# Patient Record
Sex: MALE | Race: WHITE | NOT HISPANIC OR LATINO | Employment: OTHER | URBAN - METROPOLITAN AREA
[De-identification: names, ages, dates, MRNs, and addresses within clinical notes are randomized per-mention and may not be internally consistent; named-entity substitution may affect disease eponyms.]

---

## 2025-04-23 ENCOUNTER — HOSPITAL ENCOUNTER (EMERGENCY)
Facility: HOSPITAL | Age: 50
Discharge: HOME | End: 2025-04-23
Payer: COMMERCIAL

## 2025-04-23 VITALS
OXYGEN SATURATION: 100 % | HEART RATE: 98 BPM | TEMPERATURE: 98.2 F | BODY MASS INDEX: 33.56 KG/M2 | RESPIRATION RATE: 17 BRPM | DIASTOLIC BLOOD PRESSURE: 88 MMHG | WEIGHT: 247.8 LBS | HEIGHT: 72 IN | SYSTOLIC BLOOD PRESSURE: 149 MMHG

## 2025-04-23 DIAGNOSIS — R51.9 ACUTE NONINTRACTABLE HEADACHE, UNSPECIFIED HEADACHE TYPE: Primary | ICD-10-CM

## 2025-04-23 PROCEDURE — 2500000001 HC RX 250 WO HCPCS SELF ADMINISTERED DRUGS (ALT 637 FOR MEDICARE OP): Performed by: NURSE PRACTITIONER

## 2025-04-23 PROCEDURE — 96374 THER/PROPH/DIAG INJ IV PUSH: CPT

## 2025-04-23 PROCEDURE — 96361 HYDRATE IV INFUSION ADD-ON: CPT

## 2025-04-23 PROCEDURE — 99284 EMERGENCY DEPT VISIT MOD MDM: CPT

## 2025-04-23 PROCEDURE — 2500000004 HC RX 250 GENERAL PHARMACY W/ HCPCS (ALT 636 FOR OP/ED): Mod: JZ | Performed by: NURSE PRACTITIONER

## 2025-04-23 PROCEDURE — 96375 TX/PRO/DX INJ NEW DRUG ADDON: CPT

## 2025-04-23 RX ORDER — DIPHENHYDRAMINE HYDROCHLORIDE 50 MG/ML
25 INJECTION, SOLUTION INTRAMUSCULAR; INTRAVENOUS ONCE
Status: COMPLETED | OUTPATIENT
Start: 2025-04-23 | End: 2025-04-23

## 2025-04-23 RX ORDER — NAPROXEN 500 MG/1
500 TABLET ORAL 2 TIMES DAILY PRN
Qty: 14 TABLET | Refills: 0 | Status: SHIPPED | OUTPATIENT
Start: 2025-04-23 | End: 2025-04-30

## 2025-04-23 RX ORDER — METOCLOPRAMIDE HYDROCHLORIDE 5 MG/ML
10 INJECTION INTRAMUSCULAR; INTRAVENOUS ONCE
Status: COMPLETED | OUTPATIENT
Start: 2025-04-23 | End: 2025-04-23

## 2025-04-23 RX ORDER — KETOROLAC TROMETHAMINE 30 MG/ML
15 INJECTION, SOLUTION INTRAMUSCULAR; INTRAVENOUS ONCE
Status: COMPLETED | OUTPATIENT
Start: 2025-04-23 | End: 2025-04-23

## 2025-04-23 RX ORDER — ACETAMINOPHEN 325 MG/1
650 TABLET ORAL ONCE
Status: COMPLETED | OUTPATIENT
Start: 2025-04-23 | End: 2025-04-23

## 2025-04-23 RX ADMIN — DIPHENHYDRAMINE HYDROCHLORIDE 25 MG: 50 INJECTION, SOLUTION INTRAMUSCULAR; INTRAVENOUS at 13:59

## 2025-04-23 RX ADMIN — ACETAMINOPHEN 650 MG: 325 TABLET, FILM COATED ORAL at 13:59

## 2025-04-23 RX ADMIN — KETOROLAC TROMETHAMINE 15 MG: 30 INJECTION, SOLUTION INTRAMUSCULAR at 13:59

## 2025-04-23 RX ADMIN — SODIUM CHLORIDE 1000 ML: 900 INJECTION, SOLUTION INTRAVENOUS at 13:59

## 2025-04-23 RX ADMIN — METOCLOPRAMIDE 10 MG: 5 INJECTION, SOLUTION INTRAMUSCULAR; INTRAVENOUS at 13:59

## 2025-04-23 ASSESSMENT — PAIN - FUNCTIONAL ASSESSMENT: PAIN_FUNCTIONAL_ASSESSMENT: 0-10

## 2025-04-23 ASSESSMENT — COLUMBIA-SUICIDE SEVERITY RATING SCALE - C-SSRS
2. HAVE YOU ACTUALLY HAD ANY THOUGHTS OF KILLING YOURSELF?: NO
6. HAVE YOU EVER DONE ANYTHING, STARTED TO DO ANYTHING, OR PREPARED TO DO ANYTHING TO END YOUR LIFE?: NO
1. IN THE PAST MONTH, HAVE YOU WISHED YOU WERE DEAD OR WISHED YOU COULD GO TO SLEEP AND NOT WAKE UP?: NO

## 2025-04-23 ASSESSMENT — PAIN SCALES - GENERAL
PAINLEVEL_OUTOF10: 8
PAINLEVEL_OUTOF10: 2

## 2025-04-23 NOTE — ED PROVIDER NOTES
HPI   Chief Complaint   Patient presents with    Headache     Pt complains of a headache x6 days.  Has been having diarrhea and n/v.        HPI  See my MDM      Patient History   Medical History[1]  Surgical History[2]  Family History[3]  Social History[4]    Physical Exam   ED Triage Vitals [04/23/25 1347]   Temperature Heart Rate Respirations BP   36.8 °C (98.2 °F) (!) 132 16 134/90      Pulse Ox Temp src Heart Rate Source Patient Position   98 % -- -- --      BP Location FiO2 (%)     -- --       Physical Exam  CONSTITUTIONAL: Vital signs reviewed as charted, well-developed and in no distress  Eyes: Extraocular muscles are intact. Pupils equal round and reactive to light. Conjunctiva are pink.    ENT: Mucous membranes are moist. Tongue in the midline. Pharynx was without erythema or exudates, uvula midline  LUNGS: Breath sounds equal and clear to auscultation. Good air exchange, no wheezes rales or retractions, pulse oximetry is charted.  HEART: Regular rate and rhythm without murmur thrill or rub, strong tones, auscultation is normal.  ABDOMEN: Soft and nontender without guarding rebound rigidity or mass. Bowel sounds are present and normal in all quadrants. There is no palpable masses or aneurysms identified. No hepatosplenomegaly, normal abdominal exam.  Neuro: The patient is awake, alert and oriented ×3. Moving all 4 extremities and answering questions appropriately.   MUSCULOSKELETAL: The calves are nontender to palpation. Full gross active range of motion.   PSYCH: Awake alert oriented, normal mood and affect.  Skin:  Dry, normal color, warm to the touch, no rash present.        ED Course & MDM   Diagnoses as of 04/23/25 1444   Acute nonintractable headache, unspecified headache type                 No data recorded     Mahad Coma Scale Score: 15 (04/23/25 1349 : Fay Larsen RN)                           Medical Decision Making  History obtained from: patient    Vital signs, nursing notes, current  medications, past medical history, Surgical history, allergies, social history, family History were reviewed.         HPI:  Patient is a 49-year-old male present emergency room today for a weeklong history of headache.  Stating started when he was having nausea vomiting diarrhea.  The pain in the back of the neck that radiates up to the top of the head and feels like squeezing.  Denies chest pain, shortness of breath, abdominal pain extremity edema.  States the vomiting diarrhea is been gone with the last 3 to 4 days.      10 point ROS was reviewed and negative except Noted above in HPI.  DDX: as listed above          MDM Summary/considerations:  Labs Reviewed - No data to display  No orders to display     Medications   ketorolac (Toradol) injection 15 mg (15 mg intravenous Given 4/23/25 1359)   diphenhydrAMINE (BENADryl) injection 25 mg (25 mg intravenous Given 4/23/25 1359)   metoclopramide (Reglan) injection 10 mg (10 mg intravenous Given 4/23/25 1359)   acetaminophen (Tylenol) tablet 650 mg (650 mg oral Given 4/23/25 1359)   sodium chloride 0.9 % bolus 1,000 mL (0 mL intravenous Stopped 4/23/25 1440)     Discharge Medication List as of 4/23/2025  2:37 PM        START taking these medications    Details   naproxen (Naprosyn) 500 mg tablet Take 1 tablet (500 mg) by mouth 2 times a day as needed for moderate pain (4 - 6) for up to 7 days., Starting Wed 4/23/2025, Until Wed 4/30/2025 at 2359, Print         I estimate there is a low risk for subarachnoid hemorrhage, cavernous sinus venous thrombosis, meningitis, intracranial hemorrhage, subdural hematoma, or stroke, thus I considered the discharge disposition reasonable. We have discussed the diagnosis and risks, and we agreed with discharging home to follow-up with her primary care doctor. We also discussed returning to the emergency department immediately if new or worsening symptoms occur. We have discussed the symptoms which are most concerning such as changing  or worsening pain, weakness, vomiting, or fever and necessitate immediate return.  Lebron PEREZ       the headache improved medications here in the ED.  Tachycardia resolved as well.  Patient was feeling much better.  Was discharged home stable condition.      All of the patient's questions were answered to the best of my ability.  Patient states understanding that they have been screened for an emergency today and we have not found any etiology of symptoms that requires emergent treatment or admission to the hospital at this point. They understand that they have not had definitive care day and require follow-up for treatment of their condition. They also state understanding that they may have an emergent condition that may potentially have not of detected at this visit and they must return to the emergency department if they develop any worsening of symptoms or new complaints.      I have evaluated this patient, my supervising physician was available for consultation.        Critical Care: Not warranted at this time        This chart was completed using voice recognition transcription software. Please excuse any errors of transcription including grammatical, punctuation, syntax and spelling errors.  Please contact me with any questions regarding this chart.    Procedure  Procedures       [1] History reviewed. No pertinent past medical history.  [2] History reviewed. No pertinent surgical history.  [3] No family history on file.  [4]   Social History  Tobacco Use    Smoking status: Not on file    Smokeless tobacco: Not on file   Substance Use Topics    Alcohol use: Not on file    Drug use: Not on file        JAIR Caceres-CNP  04/23/25 9881

## 2025-04-23 NOTE — DISCHARGE INSTRUCTIONS
Please return to the emergency room immediately if you have new or worsening symptoms. Symptoms of concern include changing or worsening pain, weakness, vomiting, or fever did necessitate immediate return.    Thank you for allowing us to take care of you today. While you are home, you might receive a survey about your care in our hospital. Your nurse and myself, Jerome Urena CNP, would love your honest feedback on your care. Your feedback and especially your positive comments help our hospital receive the support we need to continue to serve you and your family. Thank you again for trusting us with your care.